# Patient Record
Sex: FEMALE | Race: OTHER | Employment: UNEMPLOYED | URBAN - METROPOLITAN AREA
[De-identification: names, ages, dates, MRNs, and addresses within clinical notes are randomized per-mention and may not be internally consistent; named-entity substitution may affect disease eponyms.]

---

## 2019-05-26 ENCOUNTER — HOSPITAL ENCOUNTER (EMERGENCY)
Age: 34
Discharge: HOME | End: 2019-05-26
Attending: EMERGENCY MEDICINE | Admitting: EMERGENCY MEDICINE
Payer: SELF-PAY

## 2019-05-26 VITALS
DIASTOLIC BLOOD PRESSURE: 75 MMHG | RESPIRATION RATE: 17 BRPM | SYSTOLIC BLOOD PRESSURE: 116 MMHG | HEART RATE: 80 BPM | OXYGEN SATURATION: 99 % | TEMPERATURE: 98.5 F

## 2019-05-26 DIAGNOSIS — N30.01 ACUTE CYSTITIS WITH HEMATURIA: Primary | ICD-10-CM

## 2019-05-26 LAB
APPEARANCE UR: ABNORMAL
BACTERIA URNS QL MICRO: ABNORMAL /HPF
BILIRUB UR QL: NEGATIVE
COLOR UR: ABNORMAL
EPITH CASTS URNS QL MICRO: ABNORMAL /LPF
GLUCOSE UR STRIP.AUTO-MCNC: NEGATIVE MG/DL
HCG UR QL: NEGATIVE
HGB UR QL STRIP: ABNORMAL
HYALINE CASTS URNS QL MICRO: ABNORMAL /LPF (ref 0–5)
KETONES UR QL STRIP.AUTO: NEGATIVE MG/DL
LEUKOCYTE ESTERASE UR QL STRIP.AUTO: ABNORMAL
NITRITE UR QL STRIP.AUTO: NEGATIVE
PH UR STRIP: 7.5 [PH] (ref 5–8)
PROT UR STRIP-MCNC: 30 MG/DL
RBC #/AREA URNS HPF: ABNORMAL /HPF (ref 0–5)
SP GR UR REFRACTOMETRY: 1.02 (ref 1–1.03)
UROBILINOGEN UR QL STRIP.AUTO: 1 EU/DL (ref 0.2–1)
WBC URNS QL MICRO: >100 /HPF (ref 0–4)

## 2019-05-26 PROCEDURE — 81025 URINE PREGNANCY TEST: CPT

## 2019-05-26 PROCEDURE — 99283 EMERGENCY DEPT VISIT LOW MDM: CPT

## 2019-05-26 PROCEDURE — 81001 URINALYSIS AUTO W/SCOPE: CPT

## 2019-05-26 RX ORDER — CEPHALEXIN 500 MG/1
500 CAPSULE ORAL 4 TIMES DAILY
Qty: 28 CAP | Refills: 0 | Status: SHIPPED | OUTPATIENT
Start: 2019-05-26 | End: 2019-05-26

## 2019-05-26 RX ORDER — CEPHALEXIN 500 MG/1
250 CAPSULE ORAL 3 TIMES DAILY
Qty: 9 CAP | Refills: 0 | Status: SHIPPED | OUTPATIENT
Start: 2019-05-26 | End: 2019-05-29

## 2019-05-26 NOTE — DISCHARGE INSTRUCTIONS
Patient Education        Eleonore Doom en las mujeres: Instrucciones de cuidado - [ Urinary Tract Infection in Women: Care Instructions ]  Instrucciones de cuidado    Mimi infección urinaria (UTI, por meek siglas en inglés) es un término general que hace referencia a mimi infección que se produce en cualquier parte entre los riñones y la uretra (conducto por el cual se expulsa la orina). La mayoría de las UTI son infecciones de la vejiga. Con frecuencia, causan dolor o ardor al [place]. Las UTI son causadas por bacterias y pueden curarse con antibióticos. Asegúrese de completar el tratamiento para que la infección desaparezca. La atención de seguimiento es mimi parte clave de kenny tratamiento y seguridad. Asegúrese de hacer y acudir a todas las citas, y llame a kenny médico si está teniendo problemas. También es mimi buena idea saber los resultados de meek exámenes y mantener mimi lista de los medicamentos que mary. ¿Cómo puede cuidarse en el hogar? · [address] No deje de tomarlos por el hecho de sentirse mejor. Debe rodo todos los antibióticos hasta terminarlos. · Katherine los próximos judi o [address], giulia mayor cantidad de [place] y otros líquidos. Spencer puede ayudar a eliminar las bacterias que provocan la infección. (Si tiene mimi enfermedad de los riñones, el corazón o el hígado y tiene que Jeet's líquidos, hable con kenny médico antes de aumentar kenny consumo). · Evite las bebidas gaseosas o con cafeína. Pueden irritar la vejiga. · Orine con frecuencia. Trate de vaciar la vejiga cada vez que orine. · Para aliviar el dolor, tome un baño caliente o colóquese mimi almohadilla térmica a baja temperatura sobre la parte baja del abdomen o la yon genital. Nunca se duerma mientras [place] mimi almohadilla térmica. Para prevenir las infecciones urinarias  · Giulia abundante agua todos los días. Spencer la ayuda a orinar con frecuencia, lo que elimina las bacterias de kenny organismo.  (Si tiene [employer] enfermedad de los riñones, el corazón o el hígado y tiene que Jeet's líquidos, hable con kenny médico antes de aumentar kenny consumo). · Orine cuando necesite hacerlo. · Orine inmediatamente después de caitlin tenido [employer]. · Cámbiese las toallas sanitarias con frecuencia. · Evite el uso de lavados vaginales, los richa de burbujas, los Räterschen de higiene femenina y otros productos para la higiene femenina que contengan desodorantes. · Después de ir al baño, límpiese de adelante hacia atrás. ¿Cuándo debes pedir ayuda? Llama a tu médico ahora mismo o busca atención médica inmediata si:    · Aparecen síntomas corine fiebre, escalofríos, náuseas o vómitos por primera vez, o empeoran.     · Te empieza a doler la espalda, kiersten debajo de la caja torácica. A esto se le llama dolor en el flanco.     · Aparece eric o pus en la orina.     · Tienes problemas con los antibióticos.    Presta especial atención a los cambios en tu rodríguez y asegúrate de comunicarte con tu médico si:    · No mejoras después de caitlin tomado un antibiótico ana 2 días.     · Los síntomas desaparecen y Lety Medici. ¿Dónde puede encontrar más información en inglés? Kenyonne Madi a http://manpreet-venessa.info/. Janey Life U475 en la búsqueda para aprender más acerca de \"Infección urinaria en las mujeres: Instrucciones de cuidado - [ Urinary Tract Infection in Women: Care Instructions ]. \"  Revisado: 20 marzo, 2018  Versión del contenido: 11.9  © 9356-3616 [employer]. Las instrucciones de cuidado fueron adaptadas bajo licencia por [employer] (which disclaims liability or warranty for this information). Si usted tiene [place] afección médica o sobre estas instrucciones, siempre pregunte a kenny profesional de rodríguez. [facility] niega toda garantía o responsabilidad por kenny uso de esta información.

## 2019-05-26 NOTE — ED NOTES
8895 PA reviewed discharge instructions with the patient. The patient verbalized understanding. Patient ambulated out of [facility]ing [facility] with son. No complaints or concerns noted.

## 2019-05-28 NOTE — ED PROVIDER NOTES
27year old Greenlandic speaking female presents with two days of bladder fullness, burning and urgency. She believes she may have a UTI. Theres been no associated abdominal, flank or CVA pain, nor fevers or chills. She denies any GYN history, pregnancy or possibility of STI. History reviewed. No pertinent past medical history. History reviewed. No pertinent surgical history. History reviewed. No pertinent family history. Social History     Socioeconomic History    Marital status:      Spouse name: Not on file    Number of children: Not on file    Years of education: Not on file    Highest education level: Not on file   Occupational History    Not on file   Social Needs    Financial resource strain: Not on file    Food insecurity:     Worry: Not on file     Inability: Not on file    Transportation needs:     Medical: Not on file     Non-medical: Not on file   Tobacco Use    Smoking status: Not on file   Substance and Sexual Activity    Alcohol use: Not on file    Drug use: Not on file    Sexual activity: Not on file   Lifestyle    Physical activity:     Days per week: Not on file     Minutes per session: Not on file    Stress: Not on file   Relationships    Social connections:     Talks on phone: Not on file     Gets together: Not on file     Attends Episcopal service: Not on file     Active member of club or organization: Not on file     Attends meetings of clubs or organizations: Not on file     Relationship status: Not on file    Intimate partner violence:     Fear of current or ex partner: Not on file     Emotionally abused: Not on file     Physically abused: Not on file     Forced sexual activity: Not on file   Other Topics Concern    Not on file   Social History Narrative    Not on file         ALLERGIES: Patient has no known allergies. Review of Systems   Constitutional: Negative. Respiratory: Negative. Cardiovascular: Negative.     Gastrointestinal: Negative. Genitourinary: Positive for dysuria and urgency. Musculoskeletal: Negative. Neurological: Negative. Vitals:    05/26/19 1555 05/26/19 1624 05/26/19 1625 05/26/19 1812   BP:   112/81 116/75   Pulse: 80  82 80   Resp:   16 17   Temp:   98.7 °F (37.1 °C) 98.5 °F (36.9 °C)   SpO2: 98% 98% 98% 99%            Physical Exam   Constitutional: She is oriented to person, place, and time. She appears well-developed and well-nourished. No distress. HENT:   Head: Normocephalic and atraumatic. Eyes: Pupils are equal, round, and reactive to light. Conjunctivae and EOM are normal.   Neck: Normal range of motion. Neck supple. No tracheal deviation present. Cardiovascular: Normal rate and regular rhythm. Pulmonary/Chest: Effort normal and breath sounds normal. She has no wheezes. Abdominal: Soft. Bowel sounds are normal. She exhibits no distension. There is no hepatosplenomegaly. There is no tenderness. There is no CVA tenderness. Musculoskeletal: Normal range of motion. She exhibits no edema. Neurological: She is alert and oriented to person, place, and time. Coordination normal.   Skin: Skin is warm and dry. Capillary refill takes less than 2 seconds. No rash noted. She is not diaphoretic. Psychiatric: She has a normal mood and affect. Her behavior is normal. Judgment and thought content normal.   Nursing note and vitals reviewed. MDM  Number of Diagnoses or Management Options  Acute cystitis with hematuria: new and requires workup  Diagnosis management comments:     Acute cystitis. No evidence of pyelonephritis. Encouraged fluids. Follow up with PCP if needed.         Amount and/or Complexity of Data Reviewed  Clinical lab tests: ordered           Procedures

## 2020-11-08 ENCOUNTER — HOSPITAL ENCOUNTER (EMERGENCY)
Age: 35
Discharge: HOME | End: 2020-11-09
Attending: EMERGENCY MEDICINE | Admitting: EMERGENCY MEDICINE
Payer: SELF-PAY

## 2020-11-08 ENCOUNTER — APPOINTMENT (OUTPATIENT)
Dept: GENERAL RADIOLOGY | Age: 35
End: 2020-11-08
Attending: NURSE PRACTITIONER
Payer: SELF-PAY

## 2020-11-08 ENCOUNTER — APPOINTMENT (OUTPATIENT)
Dept: CT IMAGING | Age: 35
End: 2020-11-08
Attending: NURSE PRACTITIONER
Payer: SELF-PAY

## 2020-11-08 VITALS
RESPIRATION RATE: 16 BRPM | DIASTOLIC BLOOD PRESSURE: 67 MMHG | OXYGEN SATURATION: 100 % | SYSTOLIC BLOOD PRESSURE: 107 MMHG | WEIGHT: 120 LBS | HEIGHT: 60 IN | BODY MASS INDEX: 23.56 KG/M2 | TEMPERATURE: 98.3 F | HEART RATE: 58 BPM

## 2020-11-08 DIAGNOSIS — G56.81 PINCHED NERVE IN SHOULDER, RIGHT: ICD-10-CM

## 2020-11-08 DIAGNOSIS — J03.90 ACUTE TONSILLITIS, UNSPECIFIED ETIOLOGY: Primary | ICD-10-CM

## 2020-11-08 LAB
ANION GAP SERPL CALC-SCNC: 4 MMOL/L (ref 5–15)
BUN SERPL-MCNC: 12 MG/DL (ref 6–20)
BUN/CREAT SERPL: 16 (ref 12–20)
CALCIUM SERPL-MCNC: 8.9 MG/DL (ref 8.5–10.1)
CHLORIDE SERPL-SCNC: 108 MMOL/L (ref 97–108)
CO2 SERPL-SCNC: 27 MMOL/L (ref 21–32)
CREAT SERPL-MCNC: 0.75 MG/DL (ref 0.55–1.02)
GLUCOSE SERPL-MCNC: 96 MG/DL (ref 65–100)
POTASSIUM SERPL-SCNC: 3.7 MMOL/L (ref 3.5–5.1)
S PYO AG THROAT QL: NEGATIVE
SODIUM SERPL-SCNC: 139 MMOL/L (ref 136–145)

## 2020-11-08 PROCEDURE — 74011000636 HC RX REV CODE- 636: Performed by: RADIOLOGY

## 2020-11-08 PROCEDURE — 87070 CULTURE OTHR SPECIMN AEROBIC: CPT

## 2020-11-08 PROCEDURE — 74011000258 HC RX REV CODE- 258: Performed by: RADIOLOGY

## 2020-11-08 PROCEDURE — 73030 X-RAY EXAM OF SHOULDER: CPT

## 2020-11-08 PROCEDURE — 87880 STREP A ASSAY W/OPTIC: CPT

## 2020-11-08 PROCEDURE — 74011250637 HC RX REV CODE- 250/637: Performed by: NURSE PRACTITIONER

## 2020-11-08 PROCEDURE — 73010 X-RAY EXAM OF SHOULDER BLADE: CPT

## 2020-11-08 PROCEDURE — 80048 BASIC METABOLIC PNL TOTAL CA: CPT

## 2020-11-08 PROCEDURE — 99284 EMERGENCY DEPT VISIT MOD MDM: CPT

## 2020-11-08 PROCEDURE — 70491 CT SOFT TISSUE NECK W/DYE: CPT

## 2020-11-08 PROCEDURE — 36415 COLL VENOUS BLD VENIPUNCTURE: CPT

## 2020-11-08 PROCEDURE — 81025 URINE PREGNANCY TEST: CPT

## 2020-11-08 RX ORDER — IBUPROFEN 800 MG/1
800 TABLET ORAL
Qty: 20 TAB | Refills: 0 | Status: SHIPPED | OUTPATIENT
Start: 2020-11-08 | End: 2020-11-08 | Stop reason: SDUPTHER

## 2020-11-08 RX ORDER — METHOCARBAMOL 750 MG/1
750 TABLET, FILM COATED ORAL 4 TIMES DAILY
Qty: 20 TAB | Refills: 0 | Status: SHIPPED | OUTPATIENT
Start: 2020-11-08 | End: 2020-11-09 | Stop reason: SDUPTHER

## 2020-11-08 RX ORDER — IBUPROFEN 800 MG/1
800 TABLET ORAL
Qty: 20 TAB | Refills: 0 | Status: SHIPPED | OUTPATIENT
Start: 2020-11-08 | End: 2020-11-09 | Stop reason: SDUPTHER

## 2020-11-08 RX ORDER — METHOCARBAMOL 500 MG/1
500 TABLET, FILM COATED ORAL ONCE
Status: COMPLETED | OUTPATIENT
Start: 2020-11-08 | End: 2020-11-08

## 2020-11-08 RX ORDER — PENICILLIN V POTASSIUM 250 MG/1
500 TABLET, FILM COATED ORAL
Status: DISCONTINUED | OUTPATIENT
Start: 2020-11-08 | End: 2020-11-08

## 2020-11-08 RX ORDER — PENICILLIN V POTASSIUM 500 MG/1
500 TABLET, FILM COATED ORAL 2 TIMES DAILY
Qty: 20 TAB | Refills: 0 | Status: SHIPPED | OUTPATIENT
Start: 2020-11-08 | End: 2020-11-08 | Stop reason: SDUPTHER

## 2020-11-08 RX ORDER — SODIUM CHLORIDE 0.9 % (FLUSH) 0.9 %
10 SYRINGE (ML) INJECTION
Status: COMPLETED | OUTPATIENT
Start: 2020-11-08 | End: 2020-11-08

## 2020-11-08 RX ORDER — IBUPROFEN 400 MG/1
800 TABLET ORAL
Status: COMPLETED | OUTPATIENT
Start: 2020-11-08 | End: 2020-11-08

## 2020-11-08 RX ORDER — METHOCARBAMOL 750 MG/1
750 TABLET, FILM COATED ORAL 4 TIMES DAILY
Qty: 20 TAB | Refills: 0 | Status: SHIPPED | OUTPATIENT
Start: 2020-11-08 | End: 2020-11-08 | Stop reason: SDUPTHER

## 2020-11-08 RX ORDER — PENICILLIN V POTASSIUM 250 MG/1
500 TABLET, FILM COATED ORAL
Status: COMPLETED | OUTPATIENT
Start: 2020-11-08 | End: 2020-11-08

## 2020-11-08 RX ORDER — PENICILLIN V POTASSIUM 500 MG/1
500 TABLET, FILM COATED ORAL 2 TIMES DAILY
Qty: 20 TAB | Refills: 0 | Status: SHIPPED | OUTPATIENT
Start: 2020-11-08 | End: 2020-11-09 | Stop reason: SDUPTHER

## 2020-11-08 RX ADMIN — SODIUM CHLORIDE 100 ML: 900 INJECTION, SOLUTION INTRAVENOUS at 20:07

## 2020-11-08 RX ADMIN — Medication 10 ML: at 20:07

## 2020-11-08 RX ADMIN — PENICILLIN V POTASIUM 500 MG: 250 TABLET ORAL at 23:58

## 2020-11-08 RX ADMIN — METHOCARBAMOL 500 MG: 500 TABLET ORAL at 19:05

## 2020-11-08 RX ADMIN — IBUPROFEN 800 MG: 400 TABLET, FILM COATED ORAL at 19:05

## 2020-11-08 RX ADMIN — IOPAMIDOL 100 ML: 612 INJECTION, SOLUTION INTRAVENOUS at 20:07

## 2020-11-08 NOTE — ED PROVIDER NOTES
37 y/o female with no PMHx presents with a new problem with pain to the right side of the neck that radiates down into the right arm, and into the right scapula x 1 month, non-traumatic, denies weakness to the RUE, numbness or tingling. Endorses a  Burning sensation from the neck radiating into the arm. Also endorses mild pain in the ear, right throat discomfort that started 3 days with swelling to the right side of face, denies difficulty breathing or swallowing. Denies fall or injury to explain the right neck and arm discomfort. Taking Tylenol intermit tingly that has helped at times. Denies fever, chills, CP,SOB, cough, abdominal pain. History reviewed. No pertinent past medical history. History reviewed. No pertinent surgical history. History reviewed. No pertinent family history.     Social History     Socioeconomic History    Marital status:      Spouse name: Not on file    Number of children: Not on file    Years of education: Not on file    Highest education level: Not on file   Occupational History    Not on file   Social Needs    Financial resource strain: Not on file    Food insecurity     Worry: Not on file     Inability: Not on file    Transportation needs     Medical: Not on file     Non-medical: Not on file   Tobacco Use    Smoking status: Not on file   Substance and Sexual Activity    Alcohol use: Not on file    Drug use: Not on file    Sexual activity: Not on file   Lifestyle    Physical activity     Days per week: Not on file     Minutes per session: Not on file    Stress: Not on file   Relationships    Social connections     Talks on phone: Not on file     Gets together: Not on file     Attends Zoroastrianism service: Not on file     Active member of club or organization: Not on file     Attends meetings of clubs or organizations: Not on file     Relationship status: Not on file    Intimate partner violence     Fear of current or ex partner: Not on file     Emotionally abused: Not on file     Physically abused: Not on file     Forced sexual activity: Not on file   Other Topics Concern    Not on file   Social History Narrative    Not on file         ALLERGIES: Patient has no known allergies. Review of Systems   Constitutional: Negative for chills and fever. HENT: Positive for ear pain, facial swelling and sore throat. Negative for dental problem, mouth sores, postnasal drip, rhinorrhea, sinus pressure, sinus pain, trouble swallowing and voice change. Respiratory: Negative for cough and shortness of breath. Cardiovascular: Negative for chest pain. Gastrointestinal: Negative for abdominal pain. Musculoskeletal: Positive for arthralgias, back pain, myalgias and neck pain. Negative for joint swelling and neck stiffness. Right side neck and posterior scapular tightness. Skin: Negative for rash. Vitals:    11/08/20 1757 11/08/20 2357   BP: 113/69 107/67   Pulse: 71 (!) 58   Resp: 15 16   Temp: 99 °F (37.2 °C) 98.3 °F (36.8 °C)   SpO2: 100% 100%   Weight: 54.4 kg (120 lb)    Height: 5' (1.524 m)             Physical Exam  Vitals signs and nursing note reviewed. Constitutional:       Appearance: Normal appearance. She is well-developed, well-groomed and normal weight. Interventions: Face mask in place. HENT:      Head: Normocephalic. Right Ear: Hearing, tympanic membrane, ear canal and external ear normal.      Left Ear: Hearing, tympanic membrane, ear canal and external ear normal.      Nose: Nose normal.      Mouth/Throat:      Mouth: Mucous membranes are moist.      Dentition: Gingival swelling present. No dental caries. Pharynx: Posterior oropharyngeal erythema present. No uvula swelling. Tonsils: No tonsillar exudate or tonsillar abscesses. Comments: Mild right side gingival swelling. No swelling noted to the tonsils. Neck:      Musculoskeletal: Normal range of motion.    Cardiovascular:      Rate and Rhythm: Normal rate. Pulmonary:      Effort: Pulmonary effort is normal. No respiratory distress. Abdominal:      General: There is no distension. Musculoskeletal: Normal range of motion. Right shoulder: She exhibits tenderness, bony tenderness, swelling, pain and spasm. She exhibits normal range of motion, no effusion, no crepitus, no deformity, normal pulse and normal strength. Arms:    Skin:     General: Skin is dry. Neurological:      Mental Status: She is alert and oriented to person, place, and time.    Psychiatric:         Mood and Affect: Mood normal.          MDM  Number of Diagnoses or Management Options  Acute tonsillitis, unspecified etiology:   Pinched nerve in shoulder, right:   Diagnosis management comments: Possible: pinched nerve vs cervical fracture vs sinusitis vs tonsillar abscess vs tonsillitis  Plan: strep, urine preg, analgesia, bmp, ct soft tiss neck, xray right shoulder/scapular       Amount and/or Complexity of Data Reviewed  Clinical lab tests: reviewed and ordered  Tests in the radiology section of CPT®: reviewed and ordered  Discuss the patient with other providers: yes      VITAL SIGNS:  Patient Vitals for the past 4 hrs:   Temp Pulse Resp BP SpO2   11/08/20 2357 98.3 °F (36.8 °C) (!) 58 16 107/67 100 %         LABS:  Recent Results (from the past 6 hour(s))   METABOLIC PANEL, BASIC    Collection Time: 11/08/20  7:23 PM   Result Value Ref Range    Sodium 139 136 - 145 mmol/L    Potassium 3.7 3.5 - 5.1 mmol/L    Chloride 108 97 - 108 mmol/L    CO2 27 21 - 32 mmol/L    Anion gap 4 (L) 5 - 15 mmol/L    Glucose 96 65 - 100 mg/dL    BUN 12 6 - 20 MG/DL    Creatinine 0.75 0.55 - 1.02 MG/DL    BUN/Creatinine ratio 16 12 - 20      GFR est AA >60 >60 ml/min/1.73m2    GFR est non-AA >60 >60 ml/min/1.73m2    Calcium 8.9 8.5 - 10.1 MG/DL   POC GROUP A STREP    Collection Time: 11/08/20 10:38 PM   Result Value Ref Range    Group A strep (POC) Negative NEG          IMAGING:  CT NECK SOFT TISSUE W CONT   Final Result   IMPRESSION:   No evidence of cervical adenopathy. Mildly prominent tonsillar tissue on the right. XR SHOULDER RT AP/LAT MIN 2 V   Final Result   IMPRESSION: No acute abnormality. XR SCAPULA RT   Final Result   IMPRESSION: No acute abnormality. Medications During Visit:  Medications   methocarbamoL (ROBAXIN) tablet 500 mg (500 mg Oral Given 11/8/20 1905)   ibuprofen (MOTRIN) tablet 800 mg (800 mg Oral Given 11/8/20 1905)   sodium chloride 0.9 % bolus infusion 100 mL (0 mL IntraVENous IV Completed 11/8/20 2140)   iopamidoL (ISOVUE 300) 61 % contrast injection 100 mL (100 mL IntraVENous Given 11/8/20 2007)   sodium chloride (NS) flush 10 mL (10 mL IntraVENous Given 11/8/20 2007)   penicillin v potassium (VEETID) tablet 500 mg (500 mg Oral Given 11/8/20 2358)         DECISION MAKING:  Alin Sebastian is a 38 y.o. female who comes in as above. Otherwise healthy 39 y/o presents with tightness to the right shoulder/neck, nontraumatic x 1 month, taking Tylenol intermit tingly with mild relief. Over the last 3 days patient has noticed that she has had increased pain and discomfort to the right side of her throat with pain with swallowing. Patient denies fever, chills, abdominal pain to include N/V/D. Denies difficulty swallowing or breathing. Plan discussed with patient to obtain xray right shoulder, scapular, basic lab, analgesia, and CT soft tissue. Re-evaluation: patient states that she is feeling better. Hemodynamically stable, CT results and other labs reviewed with patient, CT remarkable with mildly prominent right tonsillar, patient states that she has had increased pain to the right side of the throat and discomfort radiating into the ear and jaw, denies difficulty swallowing or breathing. Plan discussed with patient to send home with RX for PCN, robaxin and ibuprofen. Patient provided with list for free low cost/ low cost clinic.  Patient advised to return to the ED with worsening symptoms,verbalizes understanding and agrees with plan. IMPRESSION:  1. Acute tonsillitis, unspecified etiology    2. Pinched nerve in shoulder, right        DISPOSITION:  Discharged      Discharge Medication List as of 11/8/2020 11:47 PM      CONTINUE these medications which have CHANGED    Details   penicillin v potassium (VEETID) 500 mg tablet Take 1 Tab by mouth two (2) times a day for 10 days. Indications: tonsillitis, Normal, Disp-20 Tab,R-0      methocarbamoL (ROBAXIN) 750 mg tablet Take 1 Tab by mouth four (4) times daily for 5 days. , Normal, Disp-20 Tab,R-0      ibuprofen (MOTRIN) 800 mg tablet Take 1 Tab by mouth every six (6) hours as needed for Pain for up to 7 days. , Normal, Disp-20 Tab,R-0              Follow-up Information     Follow up With Specialties Details Why Contact Info    [address] [facility] DEPT Pediatric Emergency Medicine  If symptoms worsen 500 [facility]  [phone]            The patient is asked to establish care with primary care provider in the next several days. They are to call tomorrow for an appointment. The patient is asked to return promptly for any increased concerns or worsening of symptoms. They can return to this emergency department or any other emergency department. Procedures    Discussed patient care with Fidencio Jones MD. Attending was given the opportunity to see and evaluate the patient. Agrees with care plan as discussed.   Fifi Martinez NP  1:22 AM

## 2020-11-08 NOTE — ED TRIAGE NOTES
Pt ambulatory to ED with c/o right shoulder and arm pain onset 4 weeks ago. Describes pain as a burning feeling from ear to fingertips. Denies injury.

## 2020-11-09 LAB — HCG UR QL: NEGATIVE

## 2020-11-09 RX ORDER — IBUPROFEN 800 MG/1
800 TABLET ORAL
Qty: 20 TAB | Refills: 0 | Status: SHIPPED | OUTPATIENT
Start: 2020-11-09 | End: 2020-11-16

## 2020-11-09 RX ORDER — METHOCARBAMOL 750 MG/1
750 TABLET, FILM COATED ORAL 4 TIMES DAILY
Qty: 20 TAB | Refills: 0 | Status: SHIPPED | OUTPATIENT
Start: 2020-11-09 | End: 2020-11-14

## 2020-11-09 RX ORDER — PENICILLIN V POTASSIUM 500 MG/1
500 TABLET, FILM COATED ORAL 2 TIMES DAILY
Qty: 20 TAB | Refills: 0 | Status: SHIPPED | OUTPATIENT
Start: 2020-11-09 | End: 2020-11-19

## 2020-11-09 NOTE — DISCHARGE INSTRUCTIONS
You may gargle with warm salt and water to help with throat discomfort, ensure that you are drinking adequate fluids. You may Take Tylenol 1,000 mg then 3 hours later Ibuprofen 800 mg for the pain and discomfort, take the Robaxin as needed to help with the pinched nerve in the right shoulder. [facility] SYSTEM[facility] Departments     For adult and child immunizations, family planning, TB screening, STD testing and women's health services. [facility]: [place]aves [phone]      [facility] 25   657 [facility]   [address]   170 [facility] Finn 200 [facility] [address] [phone]      2400 [facility]          [address] [address]     For primary care services, woman and child wellness, and some clinics providing specialty care. [facility] -- 1011 [facility]. [address] [place] [address] [phone]   411 [facility] 200 [facility] [address] [place] [address] [phone]   [address] [place] [address] [address] [place] [address] [phone]   [address]  [employer] [address] [place] [address] 5850  Community  [phone]   [address] [place] [address] [phone]   [facility] 81 [facility] [phone]   [facility] 1051 [facility] [phone]   [facility] 700 jenny Ibarra 79 [facility] [address] [phone]     Myron [address] [facility] [address] [phone]   [facility] 5850  Community  [phone]   [employer]  [address], [place] [address] ([email or web address]) [phone]         Sexual Health/Woman Wellness Clinics    For STD/HIV testing and treatment, pregnancy testing and services, men's health, birth control services, LGBT services, and hepatitis/HPV vaccine services. [facility] [address]  [address] [facility] 1579 [address] She[address] [phone]   [facility] [address], 5th floor [phone]   Pregnancy [address] [facility] [address]  [address]el[address] [phone]         [place] 9921 [facility] 94 [facility]   [phone]   [place]   [phone]   Women, Infant and Children's Services: [address] [phone]       600 [facility]   [phone]   [facility]   [phone] 6125 [facility]     [phone]   [address] Crisis   1212 [facility] [phone]       Local Primary Care Physicians  [facility] [phone]  MD Jose Riggins MD Dory Levin, MD [facility] Doctors [phone]  Tommie Alfred, P  MD Alissa Chavez MD Fredy Mannan, MD [address] [facility] [phone]  MD Emerald Wong MD [address] [facility] [phone]  MD Suzan Bernabe MD Luther Lamas, MD Modesto Ni, MD   [facility] [phone]  [facility] VFJFDMD Daiana BERKOWITZ, MD Georgia Ayala, NP [address] [phone]  MD Sukhwinder Lock MD Della Chol, MD Elgie Kleine, MD Anthony Roulette, MD Patrina Must, MD Marlynn Has, MD   [address] [facility] [address] 1280 Paco MD Monica [facility] [phone]  MD Archana Neville, NP  MD Sukhdev Masters MD Lorra Foreman, MD Alber Morillo, MD Maldonado Ball MD   2142 [facility] [phone]  Tracy Maravilla, MD Sharon High, FNP  Ramona Joe, CARLITO Varghese, MD Norma Young, MD Aniya Aden, MD Nessa Arguelles Taryn Babin, [address] [facility] [address] [phone]  Sue Steven, MD Nino Garcia, MD Lisha Almanza, MD Jesus Biswas, MD Darren Paz MD   [facility] [phone]  [place]ford Freida, MD Gunnar Gonsalves [phone]  Holley Cardona, MD Andria Mcgee, MD Bill Velasquez, MD   [facility] [phone]  Ariela Bedoya, MD Ellen Fierro, MD Kathy Glez, MD Carlos Ambrocio, MD Fco Read, MD Marletta Dance, NP  Owen Hu MD 1619 [facility]   [phone]  MD Sudha Landrum MD Janene Haws, MD   2100 [facility] [phone]  Lani [facility], MD Marion Marrufo, FNP  Aruna Teran, PA-C  Aruna Teran, FNP  Timo Webster, PA-C  Naz Scott, MD Ana Michael, NP   Sierra Miles, DO Miscellaneous:  [place] [phone]

## 2020-11-09 NOTE — ED NOTES
Discharge instructions given to pt by RN in [facility] [place] Pt educated on prescribed medications in teach back method and verbalizes understanding. Opportunity for questions provided. Pt ambulatory out of unit, in no acute distress and taken home by family.

## 2020-11-10 LAB
BACTERIA SPEC CULT: NORMAL
SERVICE CMNT-IMP: NORMAL